# Patient Record
Sex: MALE | Race: AMERICAN INDIAN OR ALASKA NATIVE
[De-identification: names, ages, dates, MRNs, and addresses within clinical notes are randomized per-mention and may not be internally consistent; named-entity substitution may affect disease eponyms.]

---

## 2018-05-25 ENCOUNTER — HOSPITAL ENCOUNTER (EMERGENCY)
Dept: HOSPITAL 14 - H.ER | Age: 38
LOS: 1 days | Discharge: HOME | End: 2018-05-26
Payer: COMMERCIAL

## 2018-05-25 DIAGNOSIS — F17.200: ICD-10-CM

## 2018-05-25 DIAGNOSIS — J45.909: Primary | ICD-10-CM

## 2018-05-25 PROCEDURE — 80053 COMPREHEN METABOLIC PANEL: CPT

## 2018-05-25 PROCEDURE — 85025 COMPLETE CBC W/AUTO DIFF WBC: CPT

## 2018-05-25 PROCEDURE — 96374 THER/PROPH/DIAG INJ IV PUSH: CPT

## 2018-05-25 PROCEDURE — 84484 ASSAY OF TROPONIN QUANT: CPT

## 2018-05-25 PROCEDURE — 71275 CT ANGIOGRAPHY CHEST: CPT

## 2018-05-25 PROCEDURE — 99283 EMERGENCY DEPT VISIT LOW MDM: CPT

## 2018-05-25 PROCEDURE — 93005 ELECTROCARDIOGRAM TRACING: CPT

## 2018-05-26 VITALS — TEMPERATURE: 98.6 F

## 2018-05-26 VITALS
RESPIRATION RATE: 16 BRPM | DIASTOLIC BLOOD PRESSURE: 69 MMHG | HEART RATE: 76 BPM | OXYGEN SATURATION: 100 % | SYSTOLIC BLOOD PRESSURE: 106 MMHG

## 2018-05-26 LAB
ALBUMIN SERPL-MCNC: 4.1 G/DL (ref 3.5–5)
ALBUMIN/GLOB SERPL: 1.3 {RATIO} (ref 1–2.1)
ALT SERPL-CCNC: 78 U/L (ref 21–72)
AST SERPL-CCNC: 62 U/L (ref 17–59)
BASOPHILS # BLD AUTO: 0 K/UL (ref 0–0.2)
BASOPHILS NFR BLD: 0.6 % (ref 0–2)
BUN SERPL-MCNC: 19 MG/DL (ref 9–20)
CALCIUM SERPL-MCNC: 9.1 MG/DL (ref 8.4–10.2)
EOSINOPHIL # BLD AUTO: 0.3 K/UL (ref 0–0.7)
EOSINOPHIL NFR BLD: 5.3 % (ref 0–4)
ERYTHROCYTE [DISTWIDTH] IN BLOOD BY AUTOMATED COUNT: 13.9 % (ref 11.5–14.5)
GFR NON-AFRICAN AMERICAN: > 60
HGB BLD-MCNC: 14.8 G/DL (ref 12–18)
LYMPHOCYTES # BLD AUTO: 1.7 K/UL (ref 1–4.3)
LYMPHOCYTES NFR BLD AUTO: 30.7 % (ref 20–40)
MCH RBC QN AUTO: 28.8 PG (ref 27–31)
MCHC RBC AUTO-ENTMCNC: 32.6 G/DL (ref 33–37)
MCV RBC AUTO: 88.4 FL (ref 80–94)
MONOCYTES # BLD: 0.6 K/UL (ref 0–0.8)
MONOCYTES NFR BLD: 10.4 % (ref 0–10)
NEUTROPHILS # BLD: 3 K/UL (ref 1.8–7)
NEUTROPHILS NFR BLD AUTO: 53 % (ref 50–75)
NRBC BLD AUTO-RTO: 0 % (ref 0–0)
PLATELET # BLD: 269 K/UL (ref 130–400)
PMV BLD AUTO: 7.5 FL (ref 7.2–11.7)
RBC # BLD AUTO: 5.14 MIL/UL (ref 4.4–5.9)
WBC # BLD AUTO: 5.6 K/UL (ref 4.8–10.8)

## 2018-05-26 NOTE — ED PDOC
HPI: Chest Pain


Time Seen by Provider: 05/26/18 00:03


Chief Complaint (Nursing): Chest Pain


Chief Complaint (Provider): Chest Pain


History Per: Patient


History/Exam Limitations: no limitations


Onset/Duration Of Symptoms: Days (x1 month)


Current Symptoms Are (Timing): Still Present


Additional Complaint(s): 


36 y/o  male with a pmhx of asthma, who presents to the ED for 

evaluation of right sided chest pain x1 month. Patient states his chest pain 

has been intermittent for the past month. He reports having 2 CXR, a liver US, 

and blood work which were inconclusive. He states today he sneezed and his pain 

became acutely worse. He denies nausea, vomiting, or diaphoresis. He reports 

his pain is worse when breathing. 





PMD: STEFANO








Past Medical History


Reviewed: Historical Data, Nursing Documentation, Vital Signs


Vital Signs: 


 Last Vital Signs











Temp  97.6 F   05/25/18 23:58


 


Pulse  106 H  05/25/18 23:58


 


Resp  16   05/25/18 23:58


 


BP  125/83   05/25/18 23:58


 


Pulse Ox  96   05/26/18 01:03














- Medical History


PMH: Asthma





- Surgical History


Surgical History: No Surg Hx





- Family History


Family History: States: Unknown Family Hx





- Social History


Current smoker - smoking cessation education provided: Yes


Alcohol: None


Drugs: Denies





- Home Medications


Home Medications: 


 Ambulatory Orders











 Medication  Instructions  Recorded


 


Albuterol 0.09 mg IH Q4H PRN 05/13/15


 


Albuterol [Proventil] 0.09 2 IH Q6 PRN #1 inhaler 05/13/15


 


Budesonide/Formoterol Fumarate 1 aer IH BID 05/13/15





[Symbicort]  


 


Cetirizine Hydrochloride [Zyrtec] 10 mg PO DAILY #7 tab 05/13/15


 


Prednisone 3 tab-cap PO QAM #15 tab 05/13/15


 


Naproxen [Naprosyn] 500 mg PO Q12 #20 tab 05/26/18














- Allergies


Allergies/Adverse Reactions: 


 Allergies











Allergy/AdvReac Type Severity Reaction Status Date / Time


 


No Known Allergies Allergy   Verified 05/25/18 23:57














Review of Systems


ROS Statement: Except As Marked, All Systems Reviewed And Found Negative


Constitutional: Negative for: Sweats


Cardiovascular: Positive for: Chest Pain


Gastrointestinal: Negative for: Nausea, Vomiting





Physical Exam





- Reviewed


Nursing Documentation Reviewed: Yes


Vital Signs Reviewed: Yes





- Physical Exam


Appears: Positive for: Non-toxic, No Acute Distress


Head Exam: Positive for: ATRAUMATIC, NORMAL INSPECTION, NORMOCEPHALIC


Skin: Positive for: Normal Color, Warm, Dry.  Negative for: Rash


Eye Exam: Positive for: EOMI, Normal appearance, PERRL


Neck: Positive for: Normal, Painless ROM, Supple


Cardiovascular/Chest: Negative for: Chest Non Tender (point tenderness along 

the right 5/6 rib in anterior axillary line)


Respiratory: Positive for: Normal Breath Sounds.  Negative for: Respiratory 

Distress


Gastrointestinal/Abdominal: Positive for: Normal Exam, Soft.  Negative for: 

Tenderness


Back: Positive for: Normal Inspection.  Negative for: L CVA Tenderness, R CVA 

Tenderness, Vertebral Tenderness


Extremity: Positive for: Normal ROM.  Negative for: Pedal Edema, Deformity


Neurologic/Psych: Positive for: Alert, Oriented.  Negative for: Motor/Sensory 

Deficits





- Laboratory Results


Result Diagrams: 


 05/26/18 00:57





 05/26/18 00:57





- ECG


O2 Sat by Pulse Oximetry: 96 (RA)


Pulse Ox Interpretation: Normal





Medical Decision Making


Medical Decision Making: 


Initial Impression: 36 y/o male with chest pain


Initial Plan:


--CT Angio Chest


--EKG


--CMP


--Drug screen


--Troponin I


--CBC 


--Toradol 15mg IVP


--Reevaluation 





03:08


CT ANGIO CHEST WITH IV CONTRAST FINDINGS:


Pulmonary arteries: Unremarkable. No pulmonary embolism.


Aorta: No acute findings. No thoracic aortic aneurysm.


Lungs: Unremarkable. No mass. No consolidation.


Pleural space: Unremarkable. No significant effusion. No pneumothorax.


Heart: Unremarkable. No cardiomegaly. No significant pericardial effusion. No 

evidence of RV


dysfunction.


Bones/joints: No acute fracture. No dislocation.


Soft tissues: Unremarkable.


Lymph nodes: Unremarkable. No enlarged lymph nodes.


IMPRESSION:


Unremarkable chest CTA. No pulmonary embolism.





03:30


Labs reviewed and no clinically significant abnormalities were found. Although 

patient utox positive for cocaine, patient denies use within the last week. 

Counseled patient to avoid use of illicit substances such as cocaine and stable 

for discharge. 





--------------------------------------------------------------------------------

-----------------


Scribe Attestation:   


Documented by Jermaine Paul, acting as a scribe for Nas Ramsey MD. 





Provider Scribe Attestation:


All medical record entries made by the Scribe were at my direction and 

personally dictated by me. I have reviewed the chart and agree that the record 

accurately reflects my personal performance of the history, physical exam, 

medical decision making, and the department course for this patient. I have 

also personally directed, reviewed, and agree with the discharge instructions 

and disposition.








Disposition





- Clinical Impression


Clinical Impression: 


 Chest pain, pleuritic








- Patient ED Disposition


Is Patient to be Admitted: No


Counseled Patient/Family Regarding: Studies Performed, Diagnosis, Need For 

Followup, Rx Given





- Disposition


Disposition: Routine/Home


Disposition Time: 03:30


Condition: STABLE


Prescriptions: 


Naproxen [Naprosyn] 500 mg PO Q12 #20 tab


Instructions:  Costochondritis, Pleuritic Chest Pain (DC)


Forms:  [facility] (English)

## 2018-05-26 NOTE — CARD
--------------- APPROVED REPORT --------------





EKG Measurement

Heart [id number]

[id number] [id number]

[id number]

[id number]

[id number]



<Conclusion>

Normal sinus rhythm

Possible Left atrial enlargement

??Cannot rule out Anterior infarct, age undetermined

(Check V3 lead placement.

Abnormal ECG

## 2018-05-26 NOTE — CT
EXAM:

  CT Angiography Chest With Intravenous Contrast



CLINICAL HISTORY:

  37 years old, male; Pain; Chest pain; Right-sided chest pain; Additional 

info: Chest pain R/O pe



TECHNIQUE:

  Axial computed tomographic angiography images of the chest with intravenous 

contrast using pulmonary embolism protocol.  All CT scans at this facility use 

one or more dose reduction techniques, viz.: automated exposure control; ma/kV 

adjustment per patient size (including targeted exams where dose is matched to 

indication; i.e. head); or iterative reconstruction technique.

  MIP reconstructed images were created and reviewed.

  Coronal and sagittal reformatted images were created and reviewed.



CONTRAST:

  95 mL of visipaque 320 administered intravenously.



COMPARISON:

  No relevant prior studies available.



FINDINGS:

  Pulmonary arteries:  Unremarkable.  No pulmonary embolism.

  Aorta:  No acute findings.  No thoracic aortic aneurysm.

  Lungs:  Unremarkable.  No mass.  No consolidation.

  Pleural space:  Unremarkable.  No significant effusion.  No pneumothorax.

  Heart:  Unremarkable.  No cardiomegaly.  No significant pericardial effusion. 

 No evidence of RV dysfunction.

  Bones/joints:  No acute fracture.  No dislocation.

  Soft tissues:  Unremarkable.

  Lymph nodes:  Unremarkable.  No enlarged lymph nodes.



IMPRESSION:     

Unremarkable chest CTA.  No pulmonary embolism.

## 2021-06-22 ENCOUNTER — EMERGENCY (EMERGENCY)
Facility: HOSPITAL | Age: 41
LOS: 1 days | Discharge: HOME | End: 2021-06-22
Attending: EMERGENCY MEDICINE | Admitting: EMERGENCY MEDICINE
Payer: COMMERCIAL

## 2021-06-22 VITALS
HEART RATE: 97 BPM | TEMPERATURE: 98 F | HEIGHT: 69 IN | WEIGHT: 235.01 LBS | RESPIRATION RATE: 18 BRPM | DIASTOLIC BLOOD PRESSURE: 96 MMHG | OXYGEN SATURATION: 98 % | SYSTOLIC BLOOD PRESSURE: 133 MMHG

## 2021-06-22 DIAGNOSIS — H92.02 OTALGIA, LEFT EAR: ICD-10-CM

## 2021-06-22 DIAGNOSIS — H60.02 ABSCESS OF LEFT EXTERNAL EAR: ICD-10-CM

## 2021-06-22 LAB
GRAM STN FLD: SIGNIFICANT CHANGE UP
SPECIMEN SOURCE: SIGNIFICANT CHANGE UP

## 2021-06-22 PROCEDURE — 99283 EMERGENCY DEPT VISIT LOW MDM: CPT | Mod: 25

## 2021-06-22 PROCEDURE — 69005 DRG XTRNL EAR ABSC/HEM COMP: CPT | Mod: LT

## 2021-06-22 PROCEDURE — 87070 CULTURE OTHR SPECIMN AEROBIC: CPT

## 2021-06-22 PROCEDURE — 87205 SMEAR GRAM STAIN: CPT

## 2021-06-22 PROCEDURE — 87181 SC STD AGAR DILUTION PER AGT: CPT

## 2021-06-22 PROCEDURE — 10060 I&D ABSCESS SIMPLE/SINGLE: CPT

## 2021-06-22 PROCEDURE — 87186 SC STD MICRODIL/AGAR DIL: CPT

## 2021-06-22 PROCEDURE — 82962 GLUCOSE BLOOD TEST: CPT

## 2021-06-22 RX ORDER — CEPHALEXIN 500 MG
1 CAPSULE ORAL
Qty: 14 | Refills: 0
Start: 2021-06-22 | End: 2021-06-28

## 2021-06-22 NOTE — ED ADULT TRIAGE NOTE - CHIEF COMPLAINT QUOTE
L sided ear pain and swelling for 3 days. went to urgent care started on bactrim. came in due to worsening pain.

## 2021-06-22 NOTE — ED PROVIDER NOTE - CLINICAL SUMMARY MEDICAL DECISION MAKING FREE TEXT BOX
Pt with failure to respond to Bactrim x 4 days with ev of ear abscess.  No hx of DM.  VSS, do not suspect malignant OE.  Plan I/D, resume bactrim and begin keflex as well, warm compresses and return for wound check in 48 hours.

## 2021-06-22 NOTE — ED ADULT NURSE NOTE - NSIMPLEMENTINTERV_GEN_ALL_ED
Implemented All Universal Safety Interventions:  [place] to call system. Call bell, personal items and telephone within reach. Instruct patient to call for assistance. Room bathroom lighting operational. Non-slip footwear when patient is off stretcher. Physically safe environment: no spills, clutter or unnecessary equipment. Stretcher in lowest position, wheels locked, appropriate side rails in place.

## 2021-06-22 NOTE — ED PROVIDER NOTE - NSFOLLOWUPINSTRUCTIONS_ED_ALL_ED_FT
Abscess    An abscess is an infected area that contains a collection of pus and debris. It can occur in almost any part of the body and occurs when the tissue gets infection. Symptoms include a painful mass that is red, warm, tender that might break open and HAVE drainage. If your health care provider gave you antibiotics make sure to take the full course and do not stop even if feeling better.     SEEK IMMEDIATE MEDICAL CARE IF YOU HAVE ANY OF THE FOLLOWING SYMPTOMS: chills, fever, muscle aches, or red streaking from the area.     RETURN TO ER IN 48 HOURS FOR WOUND CHECK.

## 2021-06-22 NOTE — ED PROVIDER NOTE - OBJECTIVE STATEMENT
41 yo M asthmatic presenting with L ear redness and swelling x 5 days with pain not improving despite 4 days of taking oral bactrim.  Denies fever, chills, headache.

## 2021-06-22 NOTE — ED ADULT NURSE NOTE - OBJECTIVE STATEMENT
Patient is a 39yo male c/o L ear pain x3 days with accompanied swelling. Placed on Bactrim but reports increasing pain. Denies hearing changes, fevers, chills.

## 2021-06-22 NOTE — ED PROVIDER NOTE - PATIENT PORTAL LINK FT
You can access the FollowMyHealth Patient Portal offered by [facility] by registering at the following website: [email or web address]. By joining [employer]’s FollowMyHealth portal, you will also be able to view your health information using other applications (apps) compatible with our system.

## 2021-06-22 NOTE — ED PROVIDER NOTE - ENMT, MLM
L ear with fullness, fluctuance, redness and ttp about tragus, fine pustular head to inner tragus with small amount of pus expressed.  no lobe or helix involvement.  EAM clean.  No mastoid ttp.

## 2021-06-24 ENCOUNTER — EMERGENCY (EMERGENCY)
Facility: HOSPITAL | Age: 41
LOS: 1 days | Discharge: HOME | End: 2021-06-24
Admitting: EMERGENCY MEDICINE
Payer: COMMERCIAL

## 2021-06-24 VITALS
DIASTOLIC BLOOD PRESSURE: 86 MMHG | TEMPERATURE: 98 F | OXYGEN SATURATION: 98 % | SYSTOLIC BLOOD PRESSURE: 139 MMHG | HEIGHT: 69 IN | RESPIRATION RATE: 18 BRPM | WEIGHT: 235.01 LBS | HEART RATE: 98 BPM

## 2021-06-24 DIAGNOSIS — Z51.89 ENCOUNTER FOR OTHER SPECIFIED AFTERCARE: ICD-10-CM

## 2021-06-24 DIAGNOSIS — H60.02 ABSCESS OF LEFT EXTERNAL EAR: ICD-10-CM

## 2021-06-24 LAB
-  CEFAZOLIN: SIGNIFICANT CHANGE UP
-  CLINDAMYCIN: SIGNIFICANT CHANGE UP
-  DAPTOMYCIN: SIGNIFICANT CHANGE UP
-  ERYTHROMYCIN: SIGNIFICANT CHANGE UP
-  LINEZOLID: SIGNIFICANT CHANGE UP
-  OXACILLIN: SIGNIFICANT CHANGE UP
-  RIFAMPIN: SIGNIFICANT CHANGE UP
-  TETRACYCLINE: SIGNIFICANT CHANGE UP
-  TRIMETHOPRIM/SULFAMETHOXAZOLE: SIGNIFICANT CHANGE UP
-  VANCOMYCIN: SIGNIFICANT CHANGE UP
CULTURE RESULTS: SIGNIFICANT CHANGE UP
METHOD TYPE: SIGNIFICANT CHANGE UP
METHOD TYPE: SIGNIFICANT CHANGE UP
ORGANISM # SPEC MICROSCOPIC CNT: SIGNIFICANT CHANGE UP
SPECIMEN SOURCE: SIGNIFICANT CHANGE UP

## 2021-06-24 PROCEDURE — 99282 EMERGENCY DEPT VISIT SF MDM: CPT

## 2021-06-24 PROCEDURE — 99281 EMR DPT VST MAYX REQ PHY/QHP: CPT

## 2021-06-24 NOTE — ED PROVIDER NOTE - MUSCULOSKELETAL, MLM
Spine and all extremities grossly appears normal, range of motion is not limited, no muscle atrophy or joint tenderness

## 2021-06-24 NOTE — ED PROVIDER NOTE - SKIN, MLM
Skin normal color for race, warm, dry and intact. No evidence of rash. Skin normal color for race, warm, dry and intact. No evidence of rash. left tragus abscess, still draining mucopurulent discharge, no erythema surrounding

## 2021-06-24 NOTE — ED ADULT NURSE NOTE - LATERALITY
Pt is dx with COPD, on 6L O2 via [place] daily at home, pt on portable O2 in triage, pt stated that for past 2 days,  Productive cough, body aches, abdomen pain, lower. No vomiting, pt SpO2 is 89% at this time, pt stated that she is normal low and this is a \"good number\" for her. Dizziness and weakness past 2 days, increase in urination reported  
left

## 2021-06-24 NOTE — ED ADULT NURSE NOTE - OBJECTIVE STATEMENT
Pt states "I was told to come to ER to get the wound to my left ear checked after it was drained 2 days ago". Pt denies fevers and states he is still taking him antibiotic with no side effects.

## 2021-06-24 NOTE — ED PROVIDER NOTE - NSFOLLOWUPINSTRUCTIONS_ED_ALL_ED_FT
Continue to take antibiotics as prescribed and f/u in 2 days for a wound check          ABSCESS INCISION AND DRAINAGE - Ambulatory Care           Abscess Incision and Drainage    AMBULATORY CARE:    What you need to know about an abscess incision and drainage: An abscess incision and drainage (I and D) is a procedure to drain pus from an abscess and clean it out so it can heal.    How to prepare for an I and D: Your healthcare provider will talk to you about how to prepare for an I and D. He will tell you what medicines to take or not take on the day of your I and D.    What will happen during an I and D: You will be given local or regional anesthesia to numb the area and keep you free from pain. Your healthcare provider will make an incision in your skin near or over the abscess. He will press on the area to drain the pus. A cotton swab or other medical tool wrapped in gauze may be used to clean the inside of the abscess. Your healthcare provider may wash the wound with saline (salt water). He may place plain gauze or gauze with medicine in your wound to help it heal. A dry bandage will be placed over your wound. You may be given antibiotics to treat the infection.    What are the risks of an I and D: A scar may form on your skin as it heals. Your incision may heal slowly or get infected. Your abscess may come back, even after treatment. You may need another I and D if the abscess comes back. The bacteria may spread to your heart or other organs. This can be life-threatening.    Contact your healthcare provider if:   •The area around your abscess has red streaks or is warm and painful.       •You have a fever or chills.       •You have increased redness, swelling, or pain in your wound.      •Your wound does not start to heal after a few days.      •Your abscess returns.       •You have questions or concerns about your condition or care.      NSAIDs, such as ibuprofen, help decrease swelling, pain, and fever. NSAIDs can cause stomach bleeding or kidney problems in certain people. If you take blood thinner medicine, always ask your healthcare provider if NSAIDs are safe for you. Always read the medicine label and follow directions.    Care for your wound as directed:   •Do not remove your bandage unless your healthcare provider says it is okay. Keep the bandage clean and dry. Remove your bandage and clean the wound once your healthcare provider gives you directions.       •Apply heat on the bandage over your wound for 20 to 30 minutes every 2 hours for as many days as directed. This will increase blood flow to the area and help it heal.      •Elevate your wound above level of your heart as often as you can. This will help decrease swelling and pain. Prop your wounded area on pillows or blankets to keep it elevated comfortably.      Follow up with your healthcare provider as directed: You may need to return in 1 to 3 days to have the gauze in your wound removed and your wound examined. You may be taught how to change the gauze in your wound. Write down your questions so you remember to ask them during your visits.

## 2021-06-24 NOTE — ED PROVIDER NOTE - ENMT, MLM
Airway patent, Nasal mucosa clear. Mouth with normal mucosa. left tragus- edema , slight erythema and tenderness, mucopurulent discharge+, Airway patent, Nasal mucosa clear. Mouth with normal mucosa.

## 2021-06-24 NOTE — ED PROVIDER NOTE - CLINICAL SUMMARY MEDICAL DECISION MAKING FREE TEXT BOX
39 yo M with PMH asthma presents to ED for wound check to L ear abscess x 2 days. Previously presented with L ear redness and swelling. Had I&D at ED 2 days ago. There is still pus draining from the wound, but pt reports improvement with pain and swelling. Currently taking Bactrim and Keflex bid. Denies fever, chills, HA, radiation of pain.  wound irrigated with NS, still a lot of drainage seen. Packing replaced and pt recommended to come back to ER in 2 days. will continue all his abx.

## 2021-06-24 NOTE — ED PROVIDER NOTE - OBJECTIVE STATEMENT
39 yo M with PMH asthma presents to ED for wound check to L ear abscess x 2 days. Previously presented with L ear redness and swelling. Had I&D at ED 2 days ago. There is still pus draining from the wound, but pt reports improvement with pain and swelling. Currently taking Bactrim and Keflex bid. Denies fever, chills, HA, radiation of pain.

## 2021-06-24 NOTE — ED PROVIDER NOTE - CONSTITUTIONAL, MLM
Well appearing, and in no apparent distress. normal... Well appearing, awake, alert, oriented to person, place, time/situation and in no apparent distress.

## 2021-06-24 NOTE — ED ADULT TRIAGE NOTE - CHIEF COMPLAINT QUOTE
wound check to L ear, abscess drained 2 days ago. reports +draining, decreased pain and swelling, denies fevers

## 2021-06-24 NOTE — ED PROVIDER NOTE - PATIENT PORTAL LINK FT
You can access the FollowMyHealth Patient Portal offered by [facility] by registering at the following website: [email or web address]. By joining [employer]’s FollowMyHealth portal, you will also be able to view your health information using other applications (apps) compatible with our system.

## 2021-06-27 ENCOUNTER — EMERGENCY (EMERGENCY)
Facility: HOSPITAL | Age: 41
LOS: 1 days | Discharge: HOME | End: 2021-06-27
Admitting: EMERGENCY MEDICINE
Payer: MEDICAID

## 2021-06-27 VITALS
SYSTOLIC BLOOD PRESSURE: 137 MMHG | TEMPERATURE: 98 F | RESPIRATION RATE: 18 BRPM | HEART RATE: 113 BPM | HEIGHT: 69 IN | OXYGEN SATURATION: 97 % | DIASTOLIC BLOOD PRESSURE: 78 MMHG | WEIGHT: 235.01 LBS

## 2021-06-27 DIAGNOSIS — Z48.00 ENCOUNTER FOR CHANGE OR REMOVAL OF NONSURGICAL WOUND DRESSING: ICD-10-CM

## 2021-06-27 DIAGNOSIS — J45.909 UNSPECIFIED ASTHMA, UNCOMPLICATED: ICD-10-CM

## 2021-06-27 PROCEDURE — 99282 EMERGENCY DEPT VISIT SF MDM: CPT

## 2021-06-27 NOTE — ED PROVIDER NOTE - NSFOLLOWUPINSTRUCTIONS_ED_ALL_ED_FT
Follow up with ENT in 2-3 days.                   Abscess    WHAT YOU NEED TO KNOW:    A warm compress may help your abscess drain. Your healthcare provider may make a cut in the abscess so it can drain. You may need surgery to remove an abscess that is on your hands or buttocks.    Skin Abscess         DISCHARGE INSTRUCTIONS:    Return to the emergency department if:   •The area around your abscess becomes very painful, warm, or has red streaks.      •You have a fever and chills.      •Your heart is beating faster than usual.      •You feel faint or confused.      Call your doctor if:   •Your abscess gets bigger or does not get better.      •Your abscess returns.      •You have questions or concerns about your condition or care.      Medicines: You may need any of the following:  •Antibiotics help treat a bacterial infection.      •Acetaminophen decreases pain and fever. It is available without a doctor's order. Ask how much to take and how often to take it. Follow directions. Read the labels of all other medicines you are using to see if they also contain acetaminophen, or ask your doctor or pharmacist. Acetaminophen can cause liver damage if not taken correctly. Do not use more than 4 grams (4,000 milligrams) total of acetaminophen in one day.       •NSAIDs, such as ibuprofen, help decrease swelling, pain, and fever. This medicine is available with or without a doctor's order. NSAIDs can cause stomach bleeding or kidney problems in certain people. If you take blood thinner medicine, always ask your healthcare provider if NSAIDs are safe for you. Always read the medicine label and follow directions.      •Take your medicine as directed. Contact your healthcare provider if you think your medicine is not helping or if you have side effects. Tell him or her if you are allergic to any medicine. Keep a list of the medicines, vitamins, and herbs you take. Include the amounts, and when and why you take them. Bring the list or the pill bottles to follow-up visits. Carry your medicine list with you in case of an emergency.      Self-care:   •Apply a warm compress to your abscess. This will help it open and drain. Wet a washcloth in warm, but not hot, water. Apply the compress for 10 minutes. Repeat this 4 times each day. Do not press on an abscess or try to open it with a needle. You may push the bacteria deeper or into your blood.      •Do not share your clothes, towels, or sheets with anyone. This can spread the infection to others.      •Wash your hands often. This can help prevent the spread of germs. Use soap and water or an alcohol-based hand rub.  Handwashing           Care for your wound after it is drained:   •Care for your wound as directed. If your healthcare provider says it is okay, carefully remove the bandage and gauze packing. You may need to soak the gauze to get it out of your wound. Clean your wound and the area around it as directed. Dry the area and put on new, clean bandages. Change your bandages when they get wet or dirty.      •Ask your healthcare provider how to change the gauze in your wound. Keep track of how many pieces of gauze are placed inside the wound. Do not put too much packing in the wound. Do not pack the gauze too tightly in your wound.      Follow up with your healthcare provider in 1 to 3 days: You may need to have your packing removed or your bandage changed. Write down your questions so you remember to ask them during your visits.       © Copyright [employer] 2021           back to top                          © Copyright [employer] 2021

## 2021-06-27 NOTE — ED ADULT NURSE NOTE - CHPI ED NUR SYMPTOMS NEG
no bleeding/no bleeding at site/no chills/no drainage/no fever/no inflammation/no pain/no purulent drainage/no redness

## 2021-06-27 NOTE — ED ADULT TRIAGE NOTE - CHIEF COMPLAINT QUOTE
pt arriving to ED for f/u for an abscess to the L ear s/p drainage 5-6 days ago. pt taking bacrim, state site is healing well. no f/c

## 2021-06-27 NOTE — ED PROVIDER NOTE - ENMT, MLM
Airway patent, Nasal mucosa clear. Mouth with normal mucosa. Throat has no vesicles, no oropharyngeal exudates and uvula is midline. + open wound present to tragus left ear. no pus. no fluctuance. no erythema. non tender. mild swelling present.

## 2021-06-27 NOTE — ED PROVIDER NOTE - CARE PROVIDER_API CALL
Marycruz Bernard)  Otolaryngology  [address] [place] [address], [address] [place] [address]  Phone: [phone]  Fax: [phone]  Follow Up Time: 1-3 Days

## 2021-06-27 NOTE — ED PROVIDER NOTE - CLINICAL SUMMARY MEDICAL DECISION MAKING FREE TEXT BOX
wound check left ear abscess. wound healing well, no erythema. no further palpable collection. will refer to ENT and pt instructed in wound check in 2-3 days with ENT or ED. cont antibiotics. return precautions dw pt.

## 2021-06-27 NOTE — ED PROVIDER NOTE - PATIENT PORTAL LINK FT
You can access the FollowMyHealth Patient Portal offered by [facility] by registering at the following website: [email or web address]. By joining [employer]’s FollowMyHealth portal, you will also be able to view your health information using other applications (apps) compatible with our system.

## 2021-06-27 NOTE — ED ADULT NURSE NOTE - NSIMPLEMENTINTERV_GEN_ALL_ED
Implemented All Universal Safety Interventions:  [place] to call system. Call bell, personal items and telephone within reach. Instruct patient to call for assistance. Room bathroom lighting operational. Non-slip footwear when patient is off stretcher. Physically safe environment: no spills, clutter or unnecessary equipment. Stretcher in lowest position, wheels locked, appropriate side rails in place.

## 2021-06-27 NOTE — ED PROVIDER NOTE - OBJECTIVE STATEMENT
39 y/o male with hx of asthma c/o wound check. pt states abscess drained to left ear 5 days ago. pt notes packing came out on its own. pain improved. no fever or chills. no d/c. no further complaints.

## 2022-10-13 ENCOUNTER — EMERGENCY (EMERGENCY)
Facility: HOSPITAL | Age: 42
LOS: 1 days | Discharge: HOME | End: 2022-10-13
Attending: EMERGENCY MEDICINE | Admitting: EMERGENCY MEDICINE
Payer: COMMERCIAL

## 2022-10-13 VITALS
DIASTOLIC BLOOD PRESSURE: 76 MMHG | HEIGHT: 69 IN | OXYGEN SATURATION: 100 % | SYSTOLIC BLOOD PRESSURE: 129 MMHG | WEIGHT: 212.97 LBS | RESPIRATION RATE: 18 BRPM | HEART RATE: 119 BPM | TEMPERATURE: 98 F

## 2022-10-13 VITALS — HEART RATE: 96 BPM

## 2022-10-13 DIAGNOSIS — L03.221 CELLULITIS OF NECK: ICD-10-CM

## 2022-10-13 DIAGNOSIS — L03.211 CELLULITIS OF FACE: ICD-10-CM

## 2022-10-13 DIAGNOSIS — R00.0 TACHYCARDIA, UNSPECIFIED: ICD-10-CM

## 2022-10-13 DIAGNOSIS — F12.10 CANNABIS ABUSE, UNCOMPLICATED: ICD-10-CM

## 2022-10-13 DIAGNOSIS — Z87.2 PERSONAL HISTORY OF DISEASES OF THE SKIN AND SUBCUTANEOUS TISSUE: ICD-10-CM

## 2022-10-13 DIAGNOSIS — L03.114 CELLULITIS OF LEFT UPPER LIMB: ICD-10-CM

## 2022-10-13 DIAGNOSIS — Z20.822 CONTACT WITH AND (SUSPECTED) EXPOSURE TO COVID-19: ICD-10-CM

## 2022-10-13 DIAGNOSIS — F17.200 NICOTINE DEPENDENCE, UNSPECIFIED, UNCOMPLICATED: ICD-10-CM

## 2022-10-13 DIAGNOSIS — L03.312 CELLULITIS OF BACK [ANY PART EXCEPT BUTTOCK]: ICD-10-CM

## 2022-10-13 DIAGNOSIS — J45.909 UNSPECIFIED ASTHMA, UNCOMPLICATED: ICD-10-CM

## 2022-10-13 LAB
ALBUMIN SERPL ELPH-MCNC: 3.8 G/DL — SIGNIFICANT CHANGE UP (ref 3.3–5)
ALP SERPL-CCNC: 77 U/L — SIGNIFICANT CHANGE UP (ref 40–120)
ALT FLD-CCNC: 21 U/L — SIGNIFICANT CHANGE UP (ref 10–45)
ANION GAP SERPL CALC-SCNC: 8 MMOL/L — SIGNIFICANT CHANGE UP (ref 5–17)
APTT BLD: 29.9 SEC — SIGNIFICANT CHANGE UP (ref 27.5–35.5)
AST SERPL-CCNC: 26 U/L — SIGNIFICANT CHANGE UP (ref 10–40)
BASOPHILS # BLD AUTO: 0.04 K/UL — SIGNIFICANT CHANGE UP (ref 0–0.2)
BASOPHILS NFR BLD AUTO: 0.4 % — SIGNIFICANT CHANGE UP (ref 0–2)
BILIRUB SERPL-MCNC: 0.4 MG/DL — SIGNIFICANT CHANGE UP (ref 0.2–1.2)
BUN SERPL-MCNC: 11 MG/DL — SIGNIFICANT CHANGE UP (ref 7–23)
CALCIUM SERPL-MCNC: 9.2 MG/DL — SIGNIFICANT CHANGE UP (ref 8.4–10.5)
CHLORIDE SERPL-SCNC: 100 MMOL/L — SIGNIFICANT CHANGE UP (ref 96–108)
CK MB CFR SERPL CALC: 7.2 NG/ML — HIGH (ref 0–6.7)
CK SERPL-CCNC: 457 U/L — HIGH (ref 30–200)
CO2 SERPL-SCNC: 28 MMOL/L — SIGNIFICANT CHANGE UP (ref 22–31)
CREAT SERPL-MCNC: 1.08 MG/DL — SIGNIFICANT CHANGE UP (ref 0.5–1.3)
EGFR: 88 ML/MIN/1.73M2 — SIGNIFICANT CHANGE UP
EOSINOPHIL # BLD AUTO: 0.12 K/UL — SIGNIFICANT CHANGE UP (ref 0–0.5)
EOSINOPHIL NFR BLD AUTO: 1.1 % — SIGNIFICANT CHANGE UP (ref 0–6)
GLUCOSE SERPL-MCNC: 104 MG/DL — HIGH (ref 70–99)
HCT VFR BLD CALC: 39.7 % — SIGNIFICANT CHANGE UP (ref 39–50)
HGB BLD-MCNC: 12.9 G/DL — LOW (ref 13–17)
HIV 1+2 AB+HIV1 P24 AG SERPL QL IA: SIGNIFICANT CHANGE UP
IMM GRANULOCYTES NFR BLD AUTO: 0.3 % — SIGNIFICANT CHANGE UP (ref 0–0.9)
INR BLD: 1.22 — HIGH (ref 0.88–1.16)
LACTATE SERPL-SCNC: 0.7 MMOL/L — SIGNIFICANT CHANGE UP (ref 0.5–2)
LYMPHOCYTES # BLD AUTO: 1.81 K/UL — SIGNIFICANT CHANGE UP (ref 1–3.3)
LYMPHOCYTES # BLD AUTO: 16.2 % — SIGNIFICANT CHANGE UP (ref 13–44)
MCHC RBC-ENTMCNC: 28.2 PG — SIGNIFICANT CHANGE UP (ref 27–34)
MCHC RBC-ENTMCNC: 32.5 GM/DL — SIGNIFICANT CHANGE UP (ref 32–36)
MCV RBC AUTO: 86.7 FL — SIGNIFICANT CHANGE UP (ref 80–100)
MONOCYTES # BLD AUTO: 0.94 K/UL — HIGH (ref 0–0.9)
MONOCYTES NFR BLD AUTO: 8.4 % — SIGNIFICANT CHANGE UP (ref 2–14)
NEUTROPHILS # BLD AUTO: 8.21 K/UL — HIGH (ref 1.8–7.4)
NEUTROPHILS NFR BLD AUTO: 73.6 % — SIGNIFICANT CHANGE UP (ref 43–77)
NRBC # BLD: 0 /100 WBCS — SIGNIFICANT CHANGE UP (ref 0–0)
PLATELET # BLD AUTO: 394 K/UL — SIGNIFICANT CHANGE UP (ref 150–400)
POTASSIUM SERPL-MCNC: 4 MMOL/L — SIGNIFICANT CHANGE UP (ref 3.5–5.3)
POTASSIUM SERPL-SCNC: 4 MMOL/L — SIGNIFICANT CHANGE UP (ref 3.5–5.3)
PROT SERPL-MCNC: 7.5 G/DL — SIGNIFICANT CHANGE UP (ref 6–8.3)
PROTHROM AB SERPL-ACNC: 14.5 SEC — HIGH (ref 10.5–13.4)
RBC # BLD: 4.58 M/UL — SIGNIFICANT CHANGE UP (ref 4.2–5.8)
RBC # FLD: 13.6 % — SIGNIFICANT CHANGE UP (ref 10.3–14.5)
SARS-COV-2 RNA SPEC QL NAA+PROBE: SIGNIFICANT CHANGE UP
SODIUM SERPL-SCNC: 136 MMOL/L — SIGNIFICANT CHANGE UP (ref 135–145)
WBC # BLD: 11.15 K/UL — HIGH (ref 3.8–10.5)
WBC # FLD AUTO: 11.15 K/UL — HIGH (ref 3.8–10.5)

## 2022-10-13 PROCEDURE — 83605 ASSAY OF LACTIC ACID: CPT

## 2022-10-13 PROCEDURE — 82550 ASSAY OF CK (CPK): CPT

## 2022-10-13 PROCEDURE — 99285 EMERGENCY DEPT VISIT HI MDM: CPT

## 2022-10-13 PROCEDURE — 82553 CREATINE MB FRACTION: CPT

## 2022-10-13 PROCEDURE — 85610 PROTHROMBIN TIME: CPT

## 2022-10-13 PROCEDURE — 93005 ELECTROCARDIOGRAM TRACING: CPT

## 2022-10-13 PROCEDURE — 87040 BLOOD CULTURE FOR BACTERIA: CPT

## 2022-10-13 PROCEDURE — U0005: CPT

## 2022-10-13 PROCEDURE — 96374 THER/PROPH/DIAG INJ IV PUSH: CPT

## 2022-10-13 PROCEDURE — 93010 ELECTROCARDIOGRAM REPORT: CPT

## 2022-10-13 PROCEDURE — 85025 COMPLETE CBC W/AUTO DIFF WBC: CPT

## 2022-10-13 PROCEDURE — 36415 COLL VENOUS BLD VENIPUNCTURE: CPT

## 2022-10-13 PROCEDURE — 99284 EMERGENCY DEPT VISIT MOD MDM: CPT | Mod: 25

## 2022-10-13 PROCEDURE — 80053 COMPREHEN METABOLIC PANEL: CPT

## 2022-10-13 PROCEDURE — 85730 THROMBOPLASTIN TIME PARTIAL: CPT

## 2022-10-13 PROCEDURE — U0003: CPT

## 2022-10-13 PROCEDURE — 87389 HIV-1 AG W/HIV-1&-2 AB AG IA: CPT

## 2022-10-13 RX ORDER — ACETAMINOPHEN 500 MG
650 TABLET ORAL ONCE
Refills: 0 | Status: COMPLETED | OUTPATIENT
Start: 2022-10-13 | End: 2022-10-13

## 2022-10-13 RX ORDER — CEPHALEXIN 500 MG
500 CAPSULE ORAL ONCE
Refills: 0 | Status: COMPLETED | OUTPATIENT
Start: 2022-10-13 | End: 2022-10-13

## 2022-10-13 RX ORDER — SODIUM CHLORIDE 9 MG/ML
2000 INJECTION INTRAMUSCULAR; INTRAVENOUS; SUBCUTANEOUS ONCE
Refills: 0 | Status: COMPLETED | OUTPATIENT
Start: 2022-10-13 | End: 2022-10-13

## 2022-10-13 RX ORDER — VANCOMYCIN HCL 1 G
1000 VIAL (EA) INTRAVENOUS ONCE
Refills: 0 | Status: COMPLETED | OUTPATIENT
Start: 2022-10-13 | End: 2022-10-13

## 2022-10-13 RX ORDER — CEPHALEXIN 500 MG
1 CAPSULE ORAL
Qty: 40 | Refills: 0
Start: 2022-10-13 | End: 2022-10-22

## 2022-10-13 RX ORDER — AZTREONAM 2 G
1 VIAL (EA) INJECTION
Qty: 20 | Refills: 0
Start: 2022-10-13 | End: 2022-10-22

## 2022-10-13 RX ADMIN — Medication 1 TABLET(S): at 19:15

## 2022-10-13 RX ADMIN — Medication 250 MILLIGRAM(S): at 17:44

## 2022-10-13 RX ADMIN — SODIUM CHLORIDE 2000 MILLILITER(S): 9 INJECTION INTRAMUSCULAR; INTRAVENOUS; SUBCUTANEOUS at 17:44

## 2022-10-13 RX ADMIN — Medication 650 MILLIGRAM(S): at 17:44

## 2022-10-13 RX ADMIN — Medication 500 MILLIGRAM(S): at 19:11

## 2022-10-13 NOTE — ED PROVIDER NOTE - PROGRESS NOTE DETAILS
[place]: WBC 11, hgb 12.9, INR 1.22, , other labs grossly wnl. HIV neg. Vitals improved. Pt remains w/o any systemic symptoms and very well appearing. Will broaden abx, outpt f/u.   Discussed importance of outpt follow up and given VERY STRICT return precautions. Clinically no indication for further emergent ED workup or hospitalization at this time. Stable for dc, outpt f/u.   wife at bedside.

## 2022-10-13 NOTE — ED PROVIDER NOTE - NSFOLLOWUPINSTRUCTIONS_ED_ALL_ED_FT
Can take tylenol 650mg every 6hrs as needed for pain.    Can also take motrin 600mg every 6hrs as needed for pain (WARNING: Use may cause stomach issues/problems. Take with food. Prolonged use can also cause kidney issues.).     Warm soaks twice a day.    Take antibiotics as prescribed.     Take daily pictures of affected area.    Stay well hydrated.    Return for fevers, persistent vomit, uncontrolled pain, worsening breathing, worsening lightheaded, spreading redness, worsening swelling.    VERY IMPORTANT: Follow up with primary doctor within 1-2 days.     Cellulitis    Cellulitis is a skin infection caused by bacteria. This condition occurs most often in the arms and lower legs but can occur anywhere over the body. Symptoms include redness, swelling, warm skin, tenderness, and chills/fever. If you were prescribed an antibiotic medicine, take it as told by your health care provider. Do not stop taking the antibiotic even if you start to feel better.    SEEK IMMEDIATE MEDICAL CARE IF YOU HAVE ANY OF THE FOLLOWING SYMPTOMS: worsening fever, red streaks coming from affected area, vomiting or diarrhea, or dizziness/lightheadedness.

## 2022-10-13 NOTE — ED ADULT NURSE NOTE - OBJECTIVE STATEMENT
Patient is a 40yo male reporting "multiple abscesses." Pt states he was shaving 1 week ago and thought he felt a pimple on left side of face and tried to pop it. Pt states an abscess grew over following few days, went to [facility] 3 days ago and prescribed Clindamycin. Pt states he has been taking the antibiotic but that he now has similar abscesses to left arm, mid upper back, and left side of neck. Denies fevers, hx of IVDA, vomiting.

## 2022-10-13 NOTE — ED PROVIDER NOTE - PHYSICAL EXAMINATION
no LE edema, normal equal distal pulses, steady unassisted gait.  ~1cm scab upper back w/ minimal surrounding erythema/warmth and firmness. No crepitus, fluctuance. no LE edema, normal equal distal pulses, steady unassisted gait.  back: ~1cm scab upper back w/ minimal surrounding erythema/warmth and firmness. No crepitus, fluctuance. Unofficial sono: no cobblestoning/fluid collection  R cheek, infraorbital region: 3x3cm area of erythema/warmth, mild swelling and firmness. No crepitus/fluctuance. Very small central scab. Unofficial sono: mild cobblestoning, no fluid collection  L cheek, inferior aspect: ~1.5cm area of mild swelling w/ central 1cm scab. no firmness/crepitus/fluctuance, no overlying erythema/warmth. Unofficial sono: no cobblestoning/fluid collection  L anterolateral neck: 3x1cm almost tubular area of mild swelling/firmness, ?track marks vs. scabs from picking? no fluctuance/crepitus, no erythema/warmth. Unofficial sono: no cobblestoning. Possible very small discrete fluid collection  LUE: thin skin w/ oozing serous drainage to posterolateral aspect of proximal forearm. There is edema to forearm as well. Tehre is also erythema/warmth extending from distal forearm to distal arm, not circumferential. Unofficial sono: Extensive cobblestoning, no discrete fluid collection

## 2022-10-13 NOTE — ED PROVIDER NOTE - CLINICAL SUMMARY MEDICAL DECISION MAKING FREE TEXT BOX
41M PMH asthma, p/w wound/concern for abscess. Pt states he developed pimple to L cheek ~1w ago, had worsening pain/swelling, went to UC and was started on clindamycin 3d ago, today is day 4. States that L area is worsening and that he now feels like he has abscess (pain/swelling) to R cheek, L neck, L forearm, upper back as well. Hx of abscess in the past. No other systemic symptoms.   Tachycardic, other vitals wnl. Exam as above. 41M PMH asthma, p/w wound/concern for abscess. Pt states he developed pimple to L cheek ~1w ago, had worsening pain/swelling, went to UC and was started on clindamycin 3d ago, today is day 4. States that L area is worsening and that he now feels like he has abscess (pain/swelling) to R cheek, L neck, L forearm, upper back as well. Hx of abscess in the past. States that he has been picking at all the affected areas and that he was able to express pus/blood from L forearm. No other systemic symptoms.   Tachycardic, other vitals wnl. Exam as above. Very well appearing.  ddx: Likely several areas of cellulitis. Clinically no discrete area amenable to drainage except possibly superficial L neck. Likely no benefit to attempt I and D at this time.   Labs, IVF, reassess.

## 2022-10-13 NOTE — ED PROVIDER NOTE - GASTROINTESTINAL, MLM
Verito Swift received a viral test for COVID-19. They were educated on isolation and quarantine as appropriate. For any symptoms, they were directed to seek care from their PCP, given contact information to establish with a doctor, directed to an urgent care or the emergency room.
Abdomen soft, non-tender, no guarding.

## 2022-10-13 NOTE — ED PROVIDER NOTE - PATIENT PORTAL LINK FT
You can access the FollowMyHealth Patient Portal offered by [facility] by registering at the following website: [email or web address]. By joining [facility] FollowMyHealth portal, you will also be able to view your health information using other applications (apps) compatible with our system.

## 2022-10-13 NOTE — ED ADULT NURSE NOTE - NSFALLRSKOUTCOME_ED_ALL_ED
Refill request via [facility] for Naproxen.  Last refill #[phone] x 0 on 7/11/18.  Patient last seen on 8/13/18 with a future appointment on 10/8/18.  Please advise.  
Universal Safety Interventions

## 2022-10-13 NOTE — ED PROVIDER NOTE - OBJECTIVE STATEMENT
41M PMH asthma, p/w wound/concern for abscess. Pt states he developed pimple to L cheek ~1w ago, had worsening pain/swelling, went to UC and was started on clindamycin 3d ago, today is day 4. States that L area is worsening and that he now feels like he has abscess (pain/swelling) to R cheek, L neck, L forearm, upper back as well. Hx of abscess in the past. No other systemic symptoms.   Occasional etoh/smoke, occasional marijuana, adamantly denies IVDU or other drugs.   Denies HA, focal weakness/numbness, vision changes, lightheaded, fatigue, SOB, CP, fevers, chills, rhinorrhea, cough, congestion, sore throat, nausea, vomiting, diarrhea, abd pain, urinary complaints, black/bloody stool, neck pain, back pain, LE pain, LE swelling, falls/trauma, recent travel, sick contacts. 41M PMH asthma, p/w wound/concern for abscess. Pt states he developed pimple to L cheek ~1w ago, had worsening pain/swelling, went to UC and was started on clindamycin 3d ago, today is day 4. States that L area is worsening and that he now feels like he has abscess (pain/swelling) to R cheek, L neck, L forearm, upper back as well. Hx of abscess in the past. States that he has been picking at all the affected areas and that he was able to express pus/blood from L forearm. No other systemic symptoms.   Occasional etoh/smoke, occasional marijuana, adamantly denies IVDU or other drugs.   Denies HA, focal weakness/numbness, vision changes, lightheaded, fatigue, SOB, CP, fevers, chills, rhinorrhea, cough, congestion, sore throat, nausea, vomiting, diarrhea, abd pain, urinary complaints, black/bloody stool, neck pain, back pain, LE pain, LE swelling, falls/trauma, recent travel, sick contacts.

## 2022-10-14 PROBLEM — J45.909 UNSPECIFIED ASTHMA, UNCOMPLICATED: Chronic | Status: ACTIVE | Noted: 2021-06-27

## 2022-10-18 LAB
CULTURE RESULTS: SIGNIFICANT CHANGE UP
CULTURE RESULTS: SIGNIFICANT CHANGE UP
SPECIMEN SOURCE: SIGNIFICANT CHANGE UP
SPECIMEN SOURCE: SIGNIFICANT CHANGE UP

## 2024-09-15 NOTE — ED PROCEDURE NOTE - PROCEDURE NAME, MLM
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 \par \par We discussed the mechanism of acute stroke in terms of possible causes including, plaque rupture within the vessels of the brain, distal thrombus dislodgement or underlying arrhythmia.\par \par We also discussed PFO in detail to include the underlying pathology, risks and procedure of closure. \par \par Continue low dose aspirin, clopidogrel and high intensity statin therapy for patient with recent stroke. \par Most recent  mg/dL. Goal LDL now < 70 mg/dL.\par \par He will follow up with Dr. Watson Kauffman for potential PFO closure in the next few weeks.\par Follow up with Dr. Uriel Knight for continued monitoring of ILR.  He understands that should silent AF be detected, his management would require anticoagulation with NOAC.\par \par He has been encouraged that it is safe to resume his previous level of exercise. 
Incision & Drainage
no

## 2025-05-20 NOTE — ED ADULT NURSE NOTE - SUICIDE SCREENING QUESTION 2
No